# Patient Record
Sex: MALE | Race: ASIAN | NOT HISPANIC OR LATINO | URBAN - METROPOLITAN AREA
[De-identification: names, ages, dates, MRNs, and addresses within clinical notes are randomized per-mention and may not be internally consistent; named-entity substitution may affect disease eponyms.]

---

## 2018-12-19 PROBLEM — 305058001: Status: ACTIVE | Noted: 2018-12-19

## 2020-08-06 ENCOUNTER — OFFICE VISIT (OUTPATIENT)
Dept: URBAN - METROPOLITAN AREA CLINIC 37 | Facility: CLINIC | Age: 50
End: 2020-08-06

## 2020-11-09 ENCOUNTER — TELEPHONE ENCOUNTER (OUTPATIENT)
Dept: URBAN - METROPOLITAN AREA CLINIC 35 | Facility: CLINIC | Age: 50
End: 2020-11-09

## 2020-11-20 ENCOUNTER — OFFICE VISIT (OUTPATIENT)
Dept: URBAN - METROPOLITAN AREA CLINIC 37 | Facility: CLINIC | Age: 50
End: 2020-11-20

## 2020-11-20 VITALS — BODY MASS INDEX: 22.68 KG/M2 | HEIGHT: 63 IN | WEIGHT: 128 LBS

## 2020-11-20 RX ORDER — OMEPRAZOLE 20 MG/1
1 CAPSULE TABLET, DELAYED RELEASE ORAL
Qty: 90 | Refills: 2

## 2020-11-20 RX ORDER — OMEPRAZOLE 20 MG/1
1 CAPSULE TABLET, DELAYED RELEASE ORAL
Qty: 90 | Refills: 4 | Status: ON HOLD | COMMUNITY

## 2020-11-20 NOTE — HPI-MIGRATED HPI
Acute visit : Patient is here for an acute visit -> heartburn  Last OV was 1 year ago  Patient had history of gastritis and supposed to have a follow-up appointment in 08/2019. However, he cancelled the f/u appointment.  Patient was prescribed with Omeprazole 20mg QAM since last visit but he ran out medication about 3 weeks ago. Denies nausea/ vomiting Current BM:  daily BM;   Interim investigations : Imaging studies: ->  * US Abd on 04/25/19: Normal US abd;

## 2021-01-04 ENCOUNTER — OFFICE VISIT (OUTPATIENT)
Dept: URBAN - METROPOLITAN AREA CLINIC 37 | Facility: CLINIC | Age: 51
End: 2021-01-04

## 2021-01-04 ENCOUNTER — LAB OUTSIDE AN ENCOUNTER (OUTPATIENT)
Dept: URBAN - METROPOLITAN AREA CLINIC 37 | Facility: CLINIC | Age: 51
End: 2021-01-04

## 2021-01-04 VITALS — WEIGHT: 120 LBS | HEIGHT: 63 IN | BODY MASS INDEX: 21.26 KG/M2

## 2021-01-04 RX ORDER — OMEPRAZOLE 20 MG/1
1 CAPSULE TABLET, DELAYED RELEASE ORAL
Qty: 90 | Refills: 2

## 2021-01-04 RX ORDER — OMEPRAZOLE 20 MG/1
1 CAPSULE TABLET, DELAYED RELEASE ORAL
Qty: 90 | Refills: 2 | Status: ACTIVE | COMMUNITY

## 2021-01-04 NOTE — HPI-MIGRATED HPI
Acute visit : Patient is here for an acute visit -> Abdominal pain, weight loss Patient has a personal history of Gastritis Last EGD done 07/16/2018, with Dr. Isac Juárez, negative H. pylori and small hiatal hernia noted. Gastric bxx showed gastritis with inflamation and precancerous changes (? intestinal metaplasia) Last OV was 2 months ago Patient continues taking Omeprazole 20 mg daily and with the plan to repeat EGD in 07/2021 However, despite taking PPI patient complains of epigastric pain after meals that has been more persistent for that past 4 weeks?? . Also admits unintentional weight loss: about 8-10 lbs withiin 6 weeks without exercise or diet change He has normal appetite ;

## 2021-01-13 ENCOUNTER — OFFICE VISIT (OUTPATIENT)
Dept: URBAN - METROPOLITAN AREA SURGERY CENTER 8 | Facility: SURGERY CENTER | Age: 51
End: 2021-01-13

## 2021-02-01 PROBLEM — 84089009: Status: ACTIVE | Noted: 2019-02-28

## 2021-02-02 ENCOUNTER — OFFICE VISIT (OUTPATIENT)
Dept: URBAN - METROPOLITAN AREA CLINIC 35 | Facility: CLINIC | Age: 51
End: 2021-02-02

## 2021-02-02 VITALS — BODY MASS INDEX: 21.26 KG/M2 | HEIGHT: 63 IN | WEIGHT: 120 LBS

## 2021-02-02 PROBLEM — 79922009: Status: ACTIVE | Noted: 2019-02-28

## 2021-02-02 PROBLEM — 82985000: Status: ACTIVE | Noted: 2019-02-28

## 2021-02-02 PROBLEM — 16331000: Status: ACTIVE | Noted: 2018-12-19

## 2021-02-02 RX ORDER — OMEPRAZOLE 20 MG/1
1 CAPSULE TABLET, DELAYED RELEASE ORAL
Qty: 90 | Refills: 2 | Status: ACTIVE | COMMUNITY

## 2021-02-02 RX ORDER — OMEPRAZOLE 20 MG/1
1 CAPSULE TABLET, DELAYED RELEASE ORAL
Qty: 90 | Refills: 3

## 2021-02-02 NOTE — HPI-MIGRATED HPI
Post-op OV : Patient denies -> rectal bleeding, fever, nausea & vomiting since the procedure date;   Post-op OV : Patient -> denies any new changes in his/her health status since last OV;   Post-op OV : After Small Bowel Enteroscopy -> ;   Post-op OV : Patient reports of current symptoms -> ??? ;   Post-op OV : Flexible sigmoidoscopy on -> ;   Post-op OV : After EGD on -> 01/13/2021, GE junction was irregular with esophagus bxx showing no signficant histopathology; negative for IM or dysplasia. Erythematous mucosa were found in the antrum with gastric bxx showing mild to moderate reactive/regenerative changes and focal gastric metaplasia (H&E stain only); negative for atrophy and H.pylori. Normal duodenum.  EGD was done due to history of gastritis and precancerous changes that detected in past EGD in 07/2018.  Patient is currently on Omeprazole 20mg QAM;   Post-op OV : After ERCP on -> ;

## 2021-07-12 ENCOUNTER — OFFICE VISIT (OUTPATIENT)
Dept: URBAN - METROPOLITAN AREA CLINIC 37 | Facility: CLINIC | Age: 51
End: 2021-07-12

## 2022-02-07 ENCOUNTER — OFFICE VISIT (OUTPATIENT)
Dept: URBAN - METROPOLITAN AREA CLINIC 37 | Facility: CLINIC | Age: 52
End: 2022-02-07

## 2022-02-08 ENCOUNTER — OFFICE VISIT (OUTPATIENT)
Dept: URBAN - METROPOLITAN AREA CLINIC 37 | Facility: CLINIC | Age: 52
End: 2022-02-08
Payer: COMMERCIAL

## 2022-02-08 VITALS
DIASTOLIC BLOOD PRESSURE: 84 MMHG | WEIGHT: 124 LBS | OXYGEN SATURATION: 98 % | SYSTOLIC BLOOD PRESSURE: 130 MMHG | BODY MASS INDEX: 21.97 KG/M2 | HEIGHT: 63 IN | HEART RATE: 91 BPM

## 2022-02-08 DIAGNOSIS — R12 HEARTBURN SYMPTOM: ICD-10-CM

## 2022-02-08 DIAGNOSIS — K64.9 HEMORRHOIDS WITHOUT COMPLICATION: ICD-10-CM

## 2022-02-08 DIAGNOSIS — R10.13 EPIGASTRIC PAIN: ICD-10-CM

## 2022-02-08 DIAGNOSIS — K29.50 CHRONIC GASTRITIS WITHOUT BLEEDING, UNSPECIFIED GASTRITIS TYPE: ICD-10-CM

## 2022-02-08 PROCEDURE — 99213 OFFICE O/P EST LOW 20 MIN: CPT | Performed by: NURSE PRACTITIONER

## 2022-02-08 RX ORDER — OMEPRAZOLE 20 MG/1
1 CAPSULE TABLET, DELAYED RELEASE ORAL
Qty: 90 | Refills: 3

## 2022-02-08 RX ORDER — OMEPRAZOLE 20 MG/1
1 CAPSULE TABLET, DELAYED RELEASE ORAL
Qty: 90 | Refills: 3 | Status: ACTIVE | COMMUNITY

## 2022-04-30 ENCOUNTER — TELEPHONE ENCOUNTER (OUTPATIENT)
Dept: URBAN - METROPOLITAN AREA CLINIC 121 | Facility: CLINIC | Age: 52
End: 2022-04-30

## 2022-05-01 ENCOUNTER — TELEPHONE ENCOUNTER (OUTPATIENT)
Dept: URBAN - METROPOLITAN AREA CLINIC 121 | Facility: CLINIC | Age: 52
End: 2022-05-01

## 2023-01-31 PROBLEM — 8493009: Status: ACTIVE | Noted: 2018-12-27

## 2023-02-07 ENCOUNTER — OFFICE VISIT (OUTPATIENT)
Dept: URBAN - METROPOLITAN AREA CLINIC 37 | Facility: CLINIC | Age: 53
End: 2023-02-07
Payer: COMMERCIAL

## 2023-02-07 VITALS — HEIGHT: 63 IN | WEIGHT: 127 LBS | BODY MASS INDEX: 22.5 KG/M2

## 2023-02-07 DIAGNOSIS — R10.13 EPIGASTRIC PAIN: ICD-10-CM

## 2023-02-07 DIAGNOSIS — K29.50 CHRONIC GASTRITIS WITHOUT BLEEDING, UNSPECIFIED GASTRITIS TYPE: ICD-10-CM

## 2023-02-07 DIAGNOSIS — R12 HEARTBURN SYMPTOM: ICD-10-CM

## 2023-02-07 DIAGNOSIS — K64.9 HEMORRHOIDS WITHOUT COMPLICATION: ICD-10-CM

## 2023-02-07 PROCEDURE — 99213 OFFICE O/P EST LOW 20 MIN: CPT | Performed by: NURSE PRACTITIONER

## 2023-02-07 RX ORDER — OMEPRAZOLE 20 MG/1
1 CAPSULE TABLET, DELAYED RELEASE ORAL
Qty: 90 | Refills: 3 | Status: ACTIVE | COMMUNITY

## 2023-02-07 RX ORDER — OMEPRAZOLE 20 MG/1
1 CAPSULE TABLET, DELAYED RELEASE ORAL
Qty: 90 | Refills: 3

## 2023-11-16 ENCOUNTER — P2P PATIENT RECORD (OUTPATIENT)
Age: 53
End: 2023-11-16

## 2024-02-06 ENCOUNTER — OV EP (OUTPATIENT)
Dept: URBAN - METROPOLITAN AREA CLINIC 37 | Facility: CLINIC | Age: 54
End: 2024-02-06

## 2024-02-06 RX ORDER — OMEPRAZOLE 20 MG/1
1 CAPSULE TABLET, DELAYED RELEASE ORAL
Qty: 90 | Refills: 3 | Status: ACTIVE | COMMUNITY

## 2024-02-06 RX ORDER — OMEPRAZOLE 20 MG/1
1 CAPSULE TABLET, DELAYED RELEASE ORAL
Qty: 90 | Refills: 3

## 2024-02-20 ENCOUNTER — OV EP (OUTPATIENT)
Dept: URBAN - METROPOLITAN AREA CLINIC 37 | Facility: CLINIC | Age: 54
End: 2024-02-20
Payer: COMMERCIAL

## 2024-02-20 VITALS — BODY MASS INDEX: 22.15 KG/M2 | WEIGHT: 125 LBS | HEIGHT: 63 IN

## 2024-02-20 DIAGNOSIS — K64.9 HEMORRHOIDS WITHOUT COMPLICATION: ICD-10-CM

## 2024-02-20 DIAGNOSIS — R12 HEARTBURN SYMPTOM: ICD-10-CM

## 2024-02-20 DIAGNOSIS — K29.50 CHRONIC GASTRITIS WITHOUT BLEEDING, UNSPECIFIED GASTRITIS TYPE: ICD-10-CM

## 2024-02-20 DIAGNOSIS — R10.13 EPIGASTRIC PAIN: ICD-10-CM

## 2024-02-20 PROCEDURE — 99213 OFFICE O/P EST LOW 20 MIN: CPT | Performed by: NURSE PRACTITIONER

## 2024-02-20 RX ORDER — OMEPRAZOLE 20 MG/1
1 CAPSULE TABLET, DELAYED RELEASE ORAL
Qty: 90 | Refills: 3

## 2024-02-20 RX ORDER — OMEPRAZOLE 20 MG/1
1 CAPSULE TABLET, DELAYED RELEASE ORAL
Qty: 90 | Refills: 3 | Status: ACTIVE | COMMUNITY

## 2024-02-20 NOTE — HPI-FOLLOW UP VISIT
Pt stated he has history of Hep C. No treatment required?  He F/U with PCP anually for labs and US Abd

## 2024-08-07 ENCOUNTER — OFFICE VISIT (OUTPATIENT)
Dept: URBAN - METROPOLITAN AREA CLINIC 35 | Facility: CLINIC | Age: 54
End: 2024-08-07
Payer: COMMERCIAL

## 2024-08-07 ENCOUNTER — DASHBOARD ENCOUNTERS (OUTPATIENT)
Age: 54
End: 2024-08-07

## 2024-08-07 ENCOUNTER — LAB OUTSIDE AN ENCOUNTER (OUTPATIENT)
Dept: URBAN - METROPOLITAN AREA CLINIC 35 | Facility: CLINIC | Age: 54
End: 2024-08-07

## 2024-08-07 VITALS
HEIGHT: 63 IN | DIASTOLIC BLOOD PRESSURE: 72 MMHG | WEIGHT: 125 LBS | BODY MASS INDEX: 22.15 KG/M2 | SYSTOLIC BLOOD PRESSURE: 118 MMHG

## 2024-08-07 DIAGNOSIS — K21.9 CHRONIC GERD: ICD-10-CM

## 2024-08-07 DIAGNOSIS — R10.13 POSTPRANDIAL EPIGASTRIC PAIN: ICD-10-CM

## 2024-08-07 DIAGNOSIS — K31.A0 INTESTINAL METAPLASIA OF STOMACH: ICD-10-CM

## 2024-08-07 PROBLEM — 72519002: Status: ACTIVE | Noted: 2024-08-07

## 2024-08-07 PROBLEM — 79922009: Status: ACTIVE | Noted: 2024-08-07

## 2024-08-07 PROBLEM — 235595009: Status: ACTIVE | Noted: 2024-08-07

## 2024-08-07 PROCEDURE — 99213 OFFICE O/P EST LOW 20 MIN: CPT | Performed by: HOSPITALIST

## 2024-08-07 RX ORDER — OMEPRAZOLE 20 MG/1
1 CAPSULE TABLET, DELAYED RELEASE ORAL
Qty: 90 | Refills: 3 | Status: ACTIVE | COMMUNITY

## 2024-08-07 NOTE — HPI-TODAY'S VISIT:
53 year old male with past med history of intestinal metaplasia of the stomach, GERD, hemorrhoids presents to the gastroenterology clinic for follow-up of worsening epigastric pain in the last 1 week.   Patient reports he has worsening postprandial epigastric pain which radiates to the back for the past 1 week.  He reports having appetite but reports early satiety as well as postprandial epigastric pain which limits his eating in the last 1 week.  He has increased his omeprazole 20 mg to twice daily in the last few days which has helped with his abdominal pain.  He denies any nausea, vomiting, blood in the stool, black tarry stool or change in bowel habits.  He denies any abdominal bloating.  He had EGD in 2021 which showed focal intestinal metaplasia of the stomach and recommended repeat in 3 years.  He had colonoscopy in 2018 without any colon polyps.  Denies any family history of gastric cancer or colon cancer.

## 2024-08-27 ENCOUNTER — OFFICE VISIT (OUTPATIENT)
Dept: URBAN - METROPOLITAN AREA SURGERY CENTER 8 | Facility: SURGERY CENTER | Age: 54
End: 2024-08-27

## 2024-08-27 ENCOUNTER — CLAIMS CREATED FROM THE CLAIM WINDOW (OUTPATIENT)
Dept: URBAN - METROPOLITAN AREA CLINIC 4 | Facility: CLINIC | Age: 54
End: 2024-08-27
Payer: COMMERCIAL

## 2024-08-27 DIAGNOSIS — K31.89 OTHER DISEASES OF STOMACH AND DUODENUM: ICD-10-CM

## 2024-08-27 DIAGNOSIS — K29.70 GASTRITIS, UNSPECIFIED, WITHOUT BLEEDING: ICD-10-CM

## 2024-08-27 PROCEDURE — 88312 SPECIAL STAINS GROUP 1: CPT | Performed by: PATHOLOGY

## 2024-08-27 PROCEDURE — 88305 TISSUE EXAM BY PATHOLOGIST: CPT | Performed by: PATHOLOGY

## 2024-08-27 RX ORDER — OMEPRAZOLE 20 MG/1
1 CAPSULE TABLET, DELAYED RELEASE ORAL
Qty: 90 | Refills: 3 | Status: ACTIVE | COMMUNITY

## 2024-10-07 ENCOUNTER — OFFICE VISIT (OUTPATIENT)
Dept: URBAN - METROPOLITAN AREA CLINIC 37 | Facility: CLINIC | Age: 54
End: 2024-10-07
Payer: COMMERCIAL

## 2024-10-07 ENCOUNTER — LAB OUTSIDE AN ENCOUNTER (OUTPATIENT)
Dept: URBAN - METROPOLITAN AREA CLINIC 37 | Facility: CLINIC | Age: 54
End: 2024-10-07

## 2024-10-07 VITALS
HEART RATE: 61 BPM | BODY MASS INDEX: 21.62 KG/M2 | WEIGHT: 122 LBS | OXYGEN SATURATION: 99 % | HEIGHT: 63 IN | DIASTOLIC BLOOD PRESSURE: 80 MMHG | SYSTOLIC BLOOD PRESSURE: 120 MMHG

## 2024-10-07 DIAGNOSIS — K21.9 CHRONIC GERD: ICD-10-CM

## 2024-10-07 DIAGNOSIS — K31.A0 INTESTINAL METAPLASIA OF STOMACH: ICD-10-CM

## 2024-10-07 DIAGNOSIS — R10.13 POSTPRANDIAL EPIGASTRIC PAIN: ICD-10-CM

## 2024-10-07 PROCEDURE — 99214 OFFICE O/P EST MOD 30 MIN: CPT | Performed by: HOSPITALIST

## 2024-10-07 RX ORDER — AMITRIPTYLINE HYDROCHLORIDE 25 MG/1
1 TABLET AT BEDTIME TABLET, FILM COATED ORAL ONCE A DAY
Qty: 60 TABLET | Refills: 0 | OUTPATIENT
Start: 2024-10-07

## 2024-10-07 RX ORDER — AMITRIPTYLINE HYDROCHLORIDE 10 MG/1
1 TABLET AT BEDTIME TABLET, FILM COATED ORAL ONCE A DAY
Qty: 14 TABLET | Refills: 0 | OUTPATIENT
Start: 2024-10-07 | End: 2024-10-21

## 2024-10-07 RX ORDER — OMEPRAZOLE 20 MG/1
1 TABLET 1/2 TO 1 HOUR BEFORE MORNING MEAL TABLET, DELAYED RELEASE ORAL ONCE A DAY
Qty: 90 | Refills: 1

## 2024-10-07 RX ORDER — OMEPRAZOLE 20 MG/1
1 CAPSULE TABLET, DELAYED RELEASE ORAL
Qty: 90 | Refills: 3 | Status: ACTIVE | COMMUNITY

## 2024-10-07 NOTE — HPI-TODAY'S VISIT:
53-year-old male with past med history of intestinal metaplasia of the stomach, GERD presents to the gastroenterology clinic for follow-up of EGD.  Patient had EGD done by me on August 27 which was normal.  Biopsies negative for H. pylori infection or intestinal metaplasia and consistent with chemical gastropathy.  Patient has been taking omeprazole 20 mg daily for the past several months but continues to have pain in the epigastric area.  He reports after eating greasy food the previous day he can notice the abdominal pain in the epigastric area especially next day morning which lasted for few hours before resolving.  He does not notice any other symptoms associated with epigastric pain at this time.  8.27.24 EGD report shows: Esophagogastric landmarks identified. Normal esophagus. Normal stomach. Biopsied. No gross lesions in the entire examined duodenum.    Path: (A) Stomach, Antrum, Biopsy (Cold Forceps): CHEMICAL/REACTIVE GASTROPATHY. No Evidence of H. Pylori Organisms or Intestinal Metaplasia. Negative for Dysplasia or Malignancy. (B) Stomach, Body, Biopsy (Cold Forceps): PROTON PUMP INHIBITOR EFFECT. No Evidence of H. Pylori Organisms or Intestinal Metaplasia. Negative For Dysplasia or Malignancy.

## 2024-10-08 ENCOUNTER — OFFICE VISIT (OUTPATIENT)
Dept: URBAN - METROPOLITAN AREA CLINIC 38 | Facility: CLINIC | Age: 54
End: 2024-10-08
Payer: COMMERCIAL

## 2024-10-08 DIAGNOSIS — R10.13 EPIGASTRIC PAIN: ICD-10-CM

## 2024-10-08 PROCEDURE — 76705 ECHO EXAM OF ABDOMEN: CPT | Performed by: INTERNAL MEDICINE

## 2025-01-24 ENCOUNTER — TELEPHONE ENCOUNTER (OUTPATIENT)
Dept: URBAN - METROPOLITAN AREA CLINIC 35 | Facility: CLINIC | Age: 55
End: 2025-01-24

## 2025-01-24 RX ORDER — AMITRIPTYLINE HYDROCHLORIDE 25 MG/1
1 TABLET AT BEDTIME TABLET, FILM COATED ORAL ONCE A DAY
Qty: 60 TABLET | Refills: 0
Start: 2024-10-07

## 2025-02-18 ENCOUNTER — OFFICE VISIT (OUTPATIENT)
Dept: URBAN - METROPOLITAN AREA CLINIC 37 | Facility: CLINIC | Age: 55
End: 2025-02-18

## 2025-06-12 ENCOUNTER — TELEPHONE ENCOUNTER (OUTPATIENT)
Dept: URBAN - METROPOLITAN AREA CLINIC 35 | Facility: CLINIC | Age: 55
End: 2025-06-12

## 2025-06-12 RX ORDER — OMEPRAZOLE 20 MG/1
1 TABLET 1/2 TO 1 HOUR BEFORE MORNING MEAL TABLET, DELAYED RELEASE ORAL ONCE A DAY
Qty: 90 | Refills: 0